# Patient Record
Sex: MALE | Employment: FULL TIME | URBAN - METROPOLITAN AREA
[De-identification: names, ages, dates, MRNs, and addresses within clinical notes are randomized per-mention and may not be internally consistent; named-entity substitution may affect disease eponyms.]

---

## 2021-03-18 ENCOUNTER — TELEPHONE (OUTPATIENT)
Dept: CARDIOLOGY | Facility: CLINIC | Age: 46
End: 2021-03-18

## 2021-03-18 ENCOUNTER — OFFICE VISIT (OUTPATIENT)
Dept: CARDIOLOGY | Facility: CLINIC | Age: 46
End: 2021-03-18
Payer: COMMERCIAL

## 2021-03-18 VITALS
BODY MASS INDEX: 29.06 KG/M2 | WEIGHT: 203 LBS | HEIGHT: 70 IN | SYSTOLIC BLOOD PRESSURE: 129 MMHG | HEART RATE: 94 BPM | DIASTOLIC BLOOD PRESSURE: 88 MMHG

## 2021-03-18 DIAGNOSIS — R00.2 PALPITATIONS: ICD-10-CM

## 2021-03-18 DIAGNOSIS — R00.2 PALPITATIONS: Primary | ICD-10-CM

## 2021-03-18 DIAGNOSIS — R07.89 ATYPICAL CHEST PAIN: ICD-10-CM

## 2021-03-18 LAB — D DIMER PPP FEU-MCNC: 0.3 UG/ML FEU (ref 0–0.5)

## 2021-03-18 PROCEDURE — 85379 FIBRIN DEGRADATION QUANT: CPT | Performed by: INTERNAL MEDICINE

## 2021-03-18 PROCEDURE — 99203 OFFICE O/P NEW LOW 30 MIN: CPT | Performed by: INTERNAL MEDICINE

## 2021-03-18 PROCEDURE — 36415 COLL VENOUS BLD VENIPUNCTURE: CPT | Performed by: INTERNAL MEDICINE

## 2021-03-18 PROCEDURE — 93005 ELECTROCARDIOGRAM TRACING: CPT | Performed by: INTERNAL MEDICINE

## 2021-03-18 RX ORDER — BUPRENORPHINE AND NALOXONE 8; 2 MG/1; MG/1
1 FILM, SOLUBLE BUCCAL; SUBLINGUAL 3 TIMES DAILY
COMMUNITY
Start: 2021-02-26 | End: 2021-03-28

## 2021-03-18 RX ORDER — DULOXETIN HYDROCHLORIDE 60 MG/1
60 CAPSULE, DELAYED RELEASE ORAL DAILY
COMMUNITY
Start: 2020-12-08

## 2021-03-18 RX ORDER — CHLORAL HYDRATE 500 MG
2 CAPSULE ORAL DAILY
COMMUNITY

## 2021-03-18 RX ORDER — MULTIPLE VITAMINS W/ MINERALS TAB 9MG-400MCG
1 TAB ORAL DAILY
COMMUNITY

## 2021-03-18 RX ORDER — METFORMIN HCL 500 MG
2000 TABLET, EXTENDED RELEASE 24 HR ORAL DAILY
COMMUNITY
Start: 2021-02-17

## 2021-03-18 SDOH — HEALTH STABILITY: MENTAL HEALTH: HOW MANY STANDARD DRINKS CONTAINING ALCOHOL DO YOU HAVE ON A TYPICAL DAY?: NOT ASKED

## 2021-03-18 SDOH — HEALTH STABILITY: MENTAL HEALTH: HOW OFTEN DO YOU HAVE A DRINK CONTAINING ALCOHOL?: NEVER

## 2021-03-18 SDOH — HEALTH STABILITY: MENTAL HEALTH: HOW OFTEN DO YOU HAVE 6 OR MORE DRINKS ON ONE OCCASION?: NEVER

## 2021-03-18 ASSESSMENT — MIFFLIN-ST. JEOR: SCORE: 1822.05

## 2021-03-18 NOTE — LETTER
3/18/2021    Yousif Hannon MD  [facility] [address] [address]    RE: Behrooz NO MI Zangooeibooshehri       Dear Colleague,    I had the pleasure of seeing Behrooz NO MI Zangooeibooshehri in the [facility].    HPI and Plan:   Today I had the pleasure of seeing Behrooz NO MI Zangooeibooshehri at [facility]. He is a pleasant 45 year old patient with a past medical history of recent Covid infection presents to the clinic in initial consultation.      The patient was diagnosed with Covid infection some time ago.  He noticed poor oxygen saturation as well as some chest discomfort around that time.  He had a CT scan without contrast which showed infiltrates in the lungs consistent with Covid infection.  He was started on Xarelto for concerns for possible clots by his treating physician, although, he did not have definitive diagnosis or testing for PEs or DVT.  Now, even though he has completely recovered from the infection he continues to feel some lingering symptoms such as on and off chest discomfort with exertion, elevated resting heart rate, palpitations and occasional shortness of breath.  He works as an  and his work involves climbing the stairs being on his feet all day.  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  He also swims close to 1 km at least 4-5 times a week without any difficulty.  Lastly his blood profile showed LDL cholesterol of 127 mg/dL, triglyceride 262, total cholesterol 224.    Assessment and plan  1.  Atypical chest pain  2.  Covid infection  3.  Concern for blood clots in setting of Covid infection-on Xarelto  4.  Hyperlipidemia-triglyceride 262, ,     I discussed with the patient that given he has excellent exercise tolerance and very atypical symptoms it is very unlikely that his symptoms are related to coronary  artery disease.  However, we will perform a stress echocardiogram to rule out CAD.  As far as being on evaluation in the setting of Covid infection and concern for blood clot is concerned I will do a D-dimer.  If it is elevated I will perform a CTPE.  If negative, I will discontinue Xarelto.  I will have him come back and see me in 1 month to follow-up on the results.  And that I will have discussion about his cholesterol.  Also, he continues to report elevated resting heart rate will perform 24-hour Holter monitor at that time.    Thank you for allowing me to participate in the care of Behrooz NO MI Zangooeibooshehri    This note was completed in part using Dragon voice recognition software. Although reviewed after completion, some word and grammatical errors may occur.    Mundo Dempsey MD  Cardiology    Orders Placed This Encounter   Procedures     D dimer quantitative     Follow-Up with Cardiologist     EKG 12-lead complete w/read - Clinics (performed today)     Exercise Stress Echocardiogram           There are no discontinued medications.    Encounter Diagnoses   Name Primary?     Palpitations Yes     Atypical chest pain        CURRENT MEDICATIONS:  Current Outpatient Medications   Medication Sig Dispense Refill     buprenorphine HCl-naloxone HCl (SUBOXONE) 8-2 MG per film Place 1 Film under the tongue 3 times daily       cholecalciferol (VITAMIN D3) 125 mcg (5000 units) capsule Take 500 mcg by mouth daily       DULoxetine (CYMBALTA) 60 MG capsule Take 60 mg by mouth daily       fish oil-omega-3 fatty acids 1000 MG capsule Take 2 g by mouth daily       metFORMIN (GLUCOPHAGE-XR) 500 MG 24 hr tablet Take 2,000 mg by mouth daily       multivitamin w/minerals (MULTI-VITAMIN) tablet Take 1 tablet by mouth daily       Rivaroxaban ANTICOAGULANT 15 & 20 MG TBPK Take 15 mg by mouth twice daily with food for 21 days, then 20 mg daily with evening meal.         ALLERGIES     Allergies   Allergen Reactions     Gabapentin  Palpitations       PAST MEDICAL HISTORY:  History reviewed. No pertinent past medical history.    PAST SURGICAL HISTORY:  History reviewed. No pertinent surgical history.    FAMILY HISTORY:  Family History   Problem Relation Age of Onset     Hypertension Father      Hyperlipidemia Father      Kidney Cancer Father        SOCIAL HISTORY:  Social History     Socioeconomic History     Marital status:      Spouse name: None     Number of children: None     Years of education: None     Highest education level: None   Occupational History     None   Social Needs     Financial resource strain: None     Food insecurity     Worry: None     Inability: None     Transportation needs     Medical: None     Non-medical: None   Tobacco Use     Smoking status: Former Smoker     Packs/day: 1.00     Years: 8.00     Pack years: 8.00     Types: Cigarettes     Smokeless tobacco: Never Used   Substance and Sexual Activity     Alcohol use: Never     Frequency: Never     Binge frequency: Never     Drug use: None     Sexual activity: None   Lifestyle     Physical activity     Days per week: None     Minutes per session: None     Stress: None   Relationships     Social connections     Talks on phone: None     Gets together: None     Attends Shinto service: None     Active member of club or organization: None     Attends meetings of clubs or organizations: None     Relationship status: None     Intimate partner violence     Fear of current or ex partner: None     Emotionally abused: None     Physically abused: None     Forced sexual activity: None   Other Topics Concern     Parent/sibling w/ CABG, MI or angioplasty before 65F 55M? Not Asked   Social History Narrative     None       Review of Systems:  Skin:  Negative       Eyes:  Negative      ENT:  Negative      Respiratory:  Positive for dyspnea on exertion swince COVID in January   Cardiovascular:    Positive for;palpitations    Gastroenterology: Negative      Genitourinary:  not  "assessed      Musculoskeletal:  Negative      Neurologic:  Negative      Psychiatric:  Negative      Heme/Lymph/Imm:  Negative      Endocrine:  Negative        Physical Exam:  Vitals: /88   Pulse 94   Ht 1.778 m (5' 10\")   Wt 92.1 kg (203 lb)   BMI 29.13 kg/m    Constitutional: awake, alert, no distress  Head: Normocephalic, atraumatic  Eyes: Conjunctivae and lids unremarkable, sclera white  ENT: No pallor or cyanosis  Respiratory: Good chest movement, no distress  Cardiac: Regular rate and rhythm, S1-S2 normal. No murmurs gallops or rubs.   No pedal edema.   Extremities and musculoskeletal: No gross motor deficit  Neurological.  Affect normal  Psych: Alert and oriented x3    Recent Lab Results:  LIPID RESULTS:  No results found for: CHOL, HDL, LDL, TRIG, CHOLHDLRATIO    LIVER ENZYME RESULTS:  No results found for: AST, ALT    CBC RESULTS:  No results found for: WBC, RBC, HGB, HCT, MCV, MCH, MCHC, RDW, PLT    BMP RESULTS:  No results found for: NA, POTASSIUM, CHLORIDE, CO2, ANIONGAP, GLC, BUN, CR, GFRESTIMATED, GFRESTBLACK, FABIEN     A1C RESULTS:  No results found for: A1C    INR RESULTS:  No results found for: INR    CC  No referring provider defined for this encounter.    All medical records were reviewed in detail and discussed with the patient. Greater than 30 mins were spent with the patient, 50% of this time was spent on counseling and coordination of care.  After visit summary was printed and given to the patient.    Thank you for allowing me to participate in the care of your patient.      Sincerely,     Mundo Dempsey MD     [facility]    cc:   No referring provider defined for this encounter.        "

## 2021-03-18 NOTE — TELEPHONE ENCOUNTER
Left detailed messaged Via [employer]  Dr. Dempsey's post OV review of labs:  His D-dimer is negative and I suggest that we discontinue anticoagulation.  Please let him know.     Left phone number of Team 3 [phone] if questions and updated medication list.

## 2021-03-18 NOTE — PROGRESS NOTES
HPI and Plan:   Today I had the pleasure of seeing Behrooz NO MI Zangooeibooshehri at [facility]. He is a pleasant 45 year old patient with a past medical history of recent Covid infection presents to the clinic in initial consultation.      The patient was diagnosed with Covid infection some time ago.  He noticed poor oxygen saturation as well as some chest discomfort around that time.  He had a CT scan without contrast which showed infiltrates in the lungs consistent with Covid infection.  He was started on Xarelto for concerns for possible clots by his treating physician, although, he did not have definitive diagnosis or testing for PEs or DVT.  Now, even though he has completely recovered from the infection he continues to feel some lingering symptoms such as on and off chest discomfort with exertion, elevated resting heart rate, palpitations and occasional shortness of breath.  He works as an  and his work involves climbing the stairs being on his feet all day.  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  He also swims close to 1 km at least 4-5 times a week without any difficulty.  Lastly his blood profile showed LDL cholesterol of 127 mg/dL, triglyceride 262, total cholesterol 224.    Assessment and plan  1.  Atypical chest pain  2.  Covid infection  3.  Concern for blood clots in setting of Covid infection-on Xarelto  4.  Hyperlipidemia-triglyceride 262, ,     I discussed with the patient that given he has excellent exercise tolerance and very atypical symptoms it is very unlikely that his symptoms are related to coronary artery disease.  However, we will perform a stress echocardiogram to rule out CAD.  As far as being on evaluation in the setting of Covid infection and concern for blood clot is concerned I will do a D-dimer.  If it is elevated I will perform a CTPE.  If negative, I will discontinue Xarelto.  I will  have him come back and see me in 1 month to follow-up on the results.  And that I will have discussion about his cholesterol.  Also, he continues to report elevated resting heart rate will perform 24-hour Holter monitor at that time.    Thank you for allowing me to participate in the care of Behrooz NO MI Zangooeibooshehri    This note was completed in part using Dragon voice recognition software. Although reviewed after completion, some word and grammatical errors may occur.    Mundo Dempsey MD  Cardiology    Orders Placed This Encounter   Procedures     D dimer quantitative     Follow-Up with Cardiologist     EKG 12-lead complete w/read - Clinics (performed today)     Exercise Stress Echocardiogram           There are no discontinued medications.    Encounter Diagnoses   Name Primary?     Palpitations Yes     Atypical chest pain        CURRENT MEDICATIONS:  Current Outpatient Medications   Medication Sig Dispense Refill     buprenorphine HCl-naloxone HCl (SUBOXONE) 8-2 MG per film Place 1 Film under the tongue 3 times daily       cholecalciferol (VITAMIN D3) 125 mcg (5000 units) capsule Take 500 mcg by mouth daily       DULoxetine (CYMBALTA) 60 MG capsule Take 60 mg by mouth daily       fish oil-omega-3 fatty acids 1000 MG capsule Take 2 g by mouth daily       metFORMIN (GLUCOPHAGE-XR) 500 MG 24 hr tablet Take 2,000 mg by mouth daily       multivitamin w/minerals (MULTI-VITAMIN) tablet Take 1 tablet by mouth daily       Rivaroxaban ANTICOAGULANT 15 & 20 MG TBPK Take 15 mg by mouth twice daily with food for 21 days, then 20 mg daily with evening meal.         ALLERGIES     Allergies   Allergen Reactions     Gabapentin Palpitations       PAST MEDICAL HISTORY:  History reviewed. No pertinent past medical history.    PAST SURGICAL HISTORY:  History reviewed. No pertinent surgical history.    FAMILY HISTORY:  Family History   Problem Relation Age of Onset     Hypertension Father      Hyperlipidemia Father      Kidney  "Cancer Father        SOCIAL HISTORY:  Social History     Socioeconomic History     Marital status:      Spouse name: None     Number of children: None     Years of education: None     Highest education level: None   Occupational History     None   Social Needs     Financial resource strain: None     Food insecurity     Worry: None     Inability: None     Transportation needs     Medical: None     Non-medical: None   Tobacco Use     Smoking status: Former Smoker     Packs/day: 1.00     Years: 8.00     Pack years: 8.00     Types: Cigarettes     Smokeless tobacco: Never Used   Substance and Sexual Activity     Alcohol use: Never     Frequency: Never     Binge frequency: Never     Drug use: None     Sexual activity: None   Lifestyle     Physical activity     Days per week: None     Minutes per session: None     Stress: None   Relationships     Social connections     Talks on phone: None     Gets together: None     Attends Jew service: None     Active member of club or organization: None     Attends meetings of clubs or organizations: None     Relationship status: None     Intimate partner violence     Fear of current or ex partner: None     Emotionally abused: None     Physically abused: None     Forced sexual activity: None   Other Topics Concern     Parent/sibling w/ CABG, MI or angioplasty before 65F 55M? Not Asked   Social History Narrative     None       Review of Systems:  Skin:  Negative       Eyes:  Negative      ENT:  Negative      Respiratory:  Positive for dyspnea on exertion swince COVID in January   Cardiovascular:    Positive for;palpitations    Gastroenterology: Negative      Genitourinary:  not assessed      Musculoskeletal:  Negative      Neurologic:  Negative      Psychiatric:  Negative      Heme/Lymph/Imm:  Negative      Endocrine:  Negative        Physical Exam:  Vitals: /88   Pulse 94   Ht 1.778 m (5' 10\")   Wt 92.1 kg (203 lb)   BMI 29.13 kg/m    Constitutional: awake, alert, " no distress  Head: Normocephalic, atraumatic  Eyes: Conjunctivae and lids unremarkable, sclera white  ENT: No pallor or cyanosis  Respiratory: Good chest movement, no distress  Cardiac: Regular rate and rhythm, S1-S2 normal. No murmurs gallops or rubs.   No pedal edema.   Extremities and musculoskeletal: No gross motor deficit  Neurological.  Affect normal  Psych: Alert and oriented x3    Recent Lab Results:  LIPID RESULTS:  No results found for: CHOL, HDL, LDL, TRIG, CHOLHDLRATIO    LIVER ENZYME RESULTS:  No results found for: AST, ALT    CBC RESULTS:  No results found for: WBC, RBC, HGB, HCT, MCV, MCH, MCHC, RDW, PLT    BMP RESULTS:  No results found for: NA, POTASSIUM, CHLORIDE, CO2, ANIONGAP, GLC, BUN, CR, GFRESTIMATED, GFRESTBLACK, FABIEN     A1C RESULTS:  No results found for: A1C    INR RESULTS:  No results found for: INR    CC  No referring provider defined for this encounter.    All medical records were reviewed in detail and discussed with the patient. Greater than 30 mins were spent with the patient, 50% of this time was spent on counseling and coordination of care.  After visit summary was printed and given to the patient.

## 2021-04-29 ENCOUNTER — HOSPITAL ENCOUNTER (OUTPATIENT)
Dept: CARDIOLOGY | Facility: CLINIC | Age: 46
Discharge: HOME | End: 2021-04-29
Attending: INTERNAL MEDICINE | Admitting: INTERNAL MEDICINE
Payer: COMMERCIAL

## 2021-04-29 DIAGNOSIS — R00.2 PALPITATIONS: ICD-10-CM

## 2021-04-29 PROCEDURE — 93017 CV STRESS TEST TRACING ONLY: CPT

## 2021-04-29 PROCEDURE — 93018 CV STRESS TEST I&R ONLY: CPT | Performed by: INTERNAL MEDICINE

## 2021-04-29 PROCEDURE — 93016 CV STRESS TEST SUPVJ ONLY: CPT | Performed by: INTERNAL MEDICINE

## 2021-04-29 PROCEDURE — 93321 DOPPLER ECHO F-UP/LMTD STD: CPT | Mod: 26 | Performed by: INTERNAL MEDICINE

## 2021-04-29 PROCEDURE — 93350 STRESS TTE ONLY: CPT | Mod: 26 | Performed by: INTERNAL MEDICINE

## 2021-04-29 PROCEDURE — 93325 DOPPLER ECHO COLOR FLOW MAPG: CPT | Mod: 26 | Performed by: INTERNAL MEDICINE

## 2021-04-29 PROCEDURE — 255N000002 HC RX 255 OP 636: Performed by: INTERNAL MEDICINE

## 2021-04-29 RX ADMIN — HUMAN ALBUMIN MICROSPHERES AND PERFLUTREN 6 ML: 10; .22 INJECTION, SOLUTION INTRAVENOUS at 13:06

## 2021-05-06 ENCOUNTER — OFFICE VISIT (OUTPATIENT)
Dept: CARDIOLOGY | Facility: CLINIC | Age: 46
End: 2021-05-06
Attending: INTERNAL MEDICINE
Payer: COMMERCIAL

## 2021-05-06 VITALS
BODY MASS INDEX: 29.63 KG/M2 | DIASTOLIC BLOOD PRESSURE: 86 MMHG | HEART RATE: 84 BPM | SYSTOLIC BLOOD PRESSURE: 128 MMHG | HEIGHT: 70 IN | WEIGHT: 207 LBS

## 2021-05-06 DIAGNOSIS — R07.89 ATYPICAL CHEST PAIN: ICD-10-CM

## 2021-05-06 PROCEDURE — 99214 OFFICE O/P EST MOD 30 MIN: CPT | Performed by: INTERNAL MEDICINE

## 2021-05-06 ASSESSMENT — MIFFLIN-ST. JEOR: SCORE: 1840.2

## 2021-05-06 NOTE — PROGRESS NOTES
HPI and Plan:   Today I had the pleasure of seeing Behrooz NO MI Zangooeibooshehri at [facility]. He is a pleasant 45 year old patient with a past medical history of recent Covid infection who presents to the clinic for a follow up visit. He had reported chest discomfort during initial visit and we decided to perform stress echo which was negative for ischemia.  His blood work showed LDL cholesterol of 127 mg/dL, triglyceride 262, total cholesterol 224. LDL in 2017 was 173 mg/dl. Reviewed his ekg and images of stress test today.    Assessment and plan:  1.  Atypical chest pain- improving  2.  Covid infection  3.  Concern for blood clots in setting of Covid infection- was on Xarelto briefly  4.  Hyperlipidemia- not on meds,     The patient is currently doing well from cardiac standpoint.  His chest pain has improved significantly.  I provided reassurance today and told him about the results of the stress test which are completely normal.  He is slowly starting to before more physically active which I encouraged. I told him that there is no limitation from a cardiac standpoint to him exercising.  I will recheck fasting lipids in 6 months. Otherwise, will see him on as needed basis.     Thank you for allowing me to participate in the care of Behrooz NO MI Zangooeibooshehri    This note was completed in part using Dragon voice recognition software. Although reviewed after completion, some word and grammatical errors may occur.    Mundo Dempsey MD  Cardiology    There are no discontinued medications.    Encounter Diagnosis   Name Primary?     Atypical chest pain        CURRENT MEDICATIONS:  Current Outpatient Medications   Medication Sig Dispense Refill     cholecalciferol (VITAMIN D3) 125 mcg (5000 units) capsule Take 500 mcg by mouth daily       DULoxetine (CYMBALTA) 60 MG capsule Take 60 mg by mouth daily       fish oil-omega-3 fatty acids 1000 MG capsule Take 2 g by mouth daily        metFORMIN (GLUCOPHAGE-XR) 500 MG 24 hr tablet Take 2,000 mg by mouth daily       multivitamin w/minerals (MULTI-VITAMIN) tablet Take 1 tablet by mouth daily         ALLERGIES     Allergies   Allergen Reactions     Gabapentin Palpitations       PAST MEDICAL HISTORY:  No past medical history on file.    PAST SURGICAL HISTORY:  No past surgical history on file.    FAMILY HISTORY:  Family History   Problem Relation Age of Onset     Hypertension Father      Hyperlipidemia Father      Kidney Cancer Father        SOCIAL HISTORY:  Social History     Socioeconomic History     Marital status:      Spouse name: Not on file     Number of children: Not on file     Years of education: Not on file     Highest education level: Not on file   Occupational History     Not on file   Social Needs     Financial resource strain: Not on file     Food insecurity     Worry: Not on file     Inability: Not on file     Transportation needs     Medical: Not on file     Non-medical: Not on file   Tobacco Use     Smoking status: Former Smoker     Packs/day: 1.00     Years: 8.00     Pack years: 8.00     Types: Cigarettes     Smokeless tobacco: Never Used   Substance and Sexual Activity     Alcohol use: Never     Frequency: Never     Binge frequency: Never     Drug use: Not on file     Sexual activity: Not on file   Lifestyle     Physical activity     Days per week: Not on file     Minutes per session: Not on file     Stress: Not on file   Relationships     Social connections     Talks on phone: Not on file     Gets together: Not on file     Attends Caodaism service: Not on file     Active member of club or organization: Not on file     Attends meetings of clubs or organizations: Not on file     Relationship status: Not on file     Intimate partner violence     Fear of current or ex partner: Not on file     Emotionally abused: Not on file     Physically abused: Not on file     Forced sexual activity: Not on file   Other Topics Concern      Parent/sibling w/ CABG, MI or angioplasty before 65F 55M? Not Asked   Social History Narrative     Not on file       Review of Systems:  Skin:          Eyes:         ENT:         Respiratory:          Cardiovascular:         Gastroenterology:        Genitourinary:         Musculoskeletal:         Neurologic:         Psychiatric:         Heme/Lymph/Imm:         Endocrine:           Physical Exam:  Vitals: There were no vitals taken for this visit.  Constitutional: awake, alert, no distress  Head: Normocephalic, atraumatic  Eyes: Conjunctivae and lids unremarkable, sclera white  ENT: No pallor or cyanosis  Respiratory: Good chest movement, no distress  Cardiac: Regular rate and rhythm, S1-S2 normal. No murmurs gallops or rubs.   No pedal edema.   Extremities and musculoskeletal: No gross motor deficit  Neurological.  Affect normal  Psych: Alert and oriented x3    Recent Lab Results:  LIPID RESULTS:  No results found for: CHOL, HDL, LDL, TRIG, CHOLHDLRATIO    LIVER ENZYME RESULTS:  No results found for: AST, ALT    CBC RESULTS:  No results found for: WBC, RBC, HGB, HCT, MCV, MCH, MCHC, RDW, PLT    BMP RESULTS:  No results found for: NA, POTASSIUM, CHLORIDE, CO2, ANIONGAP, GLC, BUN, CR, GFRESTIMATED, GFRESTBLACK, FABIEN     A1C RESULTS:  No results found for: A1C    INR RESULTS:  No results found for: INR    CC  No referring provider defined for this encounter.    All medical records were reviewed in detail and discussed with the patient. Greater than 30 mins were spent with the patient, 50% of this time was spent on counseling and coordination of care.  After visit summary was printed and given to the patient.

## 2021-05-06 NOTE — LETTER
5/6/2021    Yousif Hannon MD  [facility] [address] [address]    RE: Behrooz Zangooeibooshehri       Dear Colleague,    I had the pleasure of seeing Behrooz Zangooeibooshehri in the [facility].    HPI and Plan:   Today I had the pleasure of seeing Behrooz NO MI Zangooeibooshehri at [facility]. He is a pleasant 45 year old patient with a past medical history of recent Covid infection who presents to the clinic for a follow up visit. He had reported chest discomfort during initial visit and we decided to perform stress echo which was negative for ischemia.  His blood work showed LDL cholesterol of 127 mg/dL, triglyceride 262, total cholesterol 224. LDL in 2017 was 173 mg/dl. Reviewed his ekg and images of stress test today.    Assessment and plan:  1.  Atypical chest pain- improving  2.  Covid infection  3.  Concern for blood clots in setting of Covid infection- was on Xarelto briefly  4.  Hyperlipidemia- not on meds,     The patient is currently doing well from cardiac standpoint.  His chest pain has improved significantly.  I provided reassurance today and told him about the results of the stress test which are completely normal.  He is slowly starting to before more physically active which I encouraged. I told him that there is no limitation from a cardiac standpoint to him exercising.  I will recheck fasting lipids in 6 months. Otherwise, will see him on as needed basis.     Thank you for allowing me to participate in the care of Behrooz NO MI Zangooeibooshehri    This note was completed in part using Dragon voice recognition software. Although reviewed after completion, some word and grammatical errors may occur.    Mundo Dempsey MD  Cardiology    There are no discontinued medications.    Encounter Diagnosis   Name Primary?     Atypical chest pain        CURRENT MEDICATIONS:  Current Outpatient  Medications   Medication Sig Dispense Refill     cholecalciferol (VITAMIN D3) 125 mcg (5000 units) capsule Take 500 mcg by mouth daily       DULoxetine (CYMBALTA) 60 MG capsule Take 60 mg by mouth daily       fish oil-omega-3 fatty acids 1000 MG capsule Take 2 g by mouth daily       metFORMIN (GLUCOPHAGE-XR) 500 MG 24 hr tablet Take 2,000 mg by mouth daily       multivitamin w/minerals (MULTI-VITAMIN) tablet Take 1 tablet by mouth daily         ALLERGIES     Allergies   Allergen Reactions     Gabapentin Palpitations       PAST MEDICAL HISTORY:  No past medical history on file.    PAST SURGICAL HISTORY:  No past surgical history on file.    FAMILY HISTORY:  Family History   Problem Relation Age of Onset     Hypertension Father      Hyperlipidemia Father      Kidney Cancer Father        SOCIAL HISTORY:  Social History     Socioeconomic History     Marital status:      Spouse name: Not on file     Number of children: Not on file     Years of education: Not on file     Highest education level: Not on file   Occupational History     Not on file   Social Needs     Financial resource strain: Not on file     Food insecurity     Worry: Not on file     Inability: Not on file     Transportation needs     Medical: Not on file     Non-medical: Not on file   Tobacco Use     Smoking status: Former Smoker     Packs/day: 1.00     Years: 8.00     Pack years: 8.00     Types: Cigarettes     Smokeless tobacco: Never Used   Substance and Sexual Activity     Alcohol use: Never     Frequency: Never     Binge frequency: Never     Drug use: Not on file     Sexual activity: Not on file   Lifestyle     Physical activity     Days per week: Not on file     Minutes per session: Not on file     Stress: Not on file   Relationships     Social connections     Talks on phone: Not on file     Gets together: Not on file     Attends [employer] service: Not on file     Active member of club or organization: Not on file     Attends meetings of clubs  or organizations: Not on file     Relationship status: Not on file     Intimate partner violence     Fear of current or ex partner: Not on file     Emotionally abused: Not on file     Physically abused: Not on file     Forced sexual activity: Not on file   Other Topics Concern     Parent/sibling w/ CABG, MI or angioplasty before 65F 55M? Not Asked   Social History Narrative     Not on file       Review of Systems:  Skin:          Eyes:         ENT:         Respiratory:          Cardiovascular:         Gastroenterology:        Genitourinary:         Musculoskeletal:         Neurologic:         Psychiatric:         Heme/Lymph/Imm:         Endocrine:           Physical Exam:  Vitals: There were no vitals taken for this visit.  Constitutional: awake, alert, no distress  Head: Normocephalic, atraumatic  Eyes: Conjunctivae and lids unremarkable, sclera white  ENT: No pallor or cyanosis  Respiratory: Good chest movement, no distress  Cardiac: Regular rate and rhythm, S1-S2 normal. No murmurs gallops or rubs.   No pedal edema.   Extremities and musculoskeletal: No gross motor deficit  Neurological.  Affect normal  Psych: Alert and oriented x3    Recent Lab Results:  LIPID RESULTS:  No results found for: CHOL, HDL, LDL, TRIG, CHOLHDLRATIO    LIVER ENZYME RESULTS:  No results found for: AST, ALT    CBC RESULTS:  No results found for: WBC, RBC, HGB, HCT, MCV, MCH, MCHC, RDW, PLT    BMP RESULTS:  No results found for: NA, POTASSIUM, CHLORIDE, CO2, ANIONGAP, GLC, BUN, CR, GFRESTIMATED, GFRESTBLACK, FABIEN     A1C RESULTS:  No results found for: A1C    INR RESULTS:  No results found for: INR    All medical records were reviewed in detail and discussed with the patient. Greater than 30 mins were spent with the patient, 50% of this time was spent on counseling and coordination of care.  After visit summary was printed and given to the patient.    Thank you for allowing me to participate in the care of your patient.      Sincerely,      Mundo Dempsey MD     [facility]    cc:   Mundo Dempsey MD  [address]  [place] [id number]

## 2021-11-28 ENCOUNTER — HEALTH MAINTENANCE LETTER (OUTPATIENT)
Age: 46
End: 2021-11-28

## 2022-09-10 ENCOUNTER — HEALTH MAINTENANCE LETTER (OUTPATIENT)
Age: 47
End: 2022-09-10

## 2023-01-22 ENCOUNTER — HEALTH MAINTENANCE LETTER (OUTPATIENT)
Age: 48
End: 2023-01-22

## 2024-02-18 ENCOUNTER — HEALTH MAINTENANCE LETTER (OUTPATIENT)
Age: 49
End: 2024-02-18